# Patient Record
Sex: FEMALE | Race: WHITE | URBAN - NONMETROPOLITAN AREA
[De-identification: names, ages, dates, MRNs, and addresses within clinical notes are randomized per-mention and may not be internally consistent; named-entity substitution may affect disease eponyms.]

---

## 2017-05-09 ENCOUNTER — HOSPITAL ENCOUNTER (EMERGENCY)
Facility: HOSPITAL | Age: 22
Discharge: HOME | End: 2017-05-09
Attending: NURSE PRACTITIONER | Admitting: NURSE PRACTITIONER
Payer: COMMERCIAL

## 2017-05-09 VITALS
RESPIRATION RATE: 18 BRPM | SYSTOLIC BLOOD PRESSURE: 124 MMHG | TEMPERATURE: 98.2 F | DIASTOLIC BLOOD PRESSURE: 78 MMHG | OXYGEN SATURATION: 98 % | HEART RATE: 94 BPM | WEIGHT: 117.39 LBS

## 2017-05-09 DIAGNOSIS — M25.461 EFFUSION OF RIGHT KNEE: ICD-10-CM

## 2017-05-09 LAB
ALBUMIN SERPL-MCNC: 3.8 G/DL (ref 3.4–5)
ALP SERPL-CCNC: 67 U/L (ref 40–150)
ALT SERPL W P-5'-P-CCNC: 24 U/L (ref 0–50)
ANION GAP SERPL CALCULATED.3IONS-SCNC: 11 MMOL/L (ref 3–14)
AST SERPL W P-5'-P-CCNC: 12 U/L (ref 0–45)
BASOPHILS # BLD AUTO: 0 10E9/L (ref 0–0.2)
BASOPHILS NFR BLD AUTO: 0.3 %
BILIRUB SERPL-MCNC: 0.4 MG/DL (ref 0.2–1.3)
BUN SERPL-MCNC: 6 MG/DL (ref 7–30)
CALCIUM SERPL-MCNC: 8.8 MG/DL (ref 8.5–10.1)
CHLORIDE SERPL-SCNC: 107 MMOL/L (ref 94–109)
CO2 SERPL-SCNC: 25 MMOL/L (ref 20–32)
CREAT SERPL-MCNC: 0.52 MG/DL (ref 0.52–1.04)
CRP SERPL-MCNC: 6.8 MG/L (ref 0–8)
DIFFERENTIAL METHOD BLD: NORMAL
EOSINOPHIL # BLD AUTO: 0.1 10E9/L (ref 0–0.7)
EOSINOPHIL NFR BLD AUTO: 1 %
ERYTHROCYTE [DISTWIDTH] IN BLOOD BY AUTOMATED COUNT: 12.4 % (ref 10–15)
ERYTHROCYTE [SEDIMENTATION RATE] IN BLOOD BY WESTERGREN METHOD: 9 MM/H (ref 0–20)
GFR SERPL CREATININE-BSD FRML MDRD: ABNORMAL ML/MIN/1.7M2
GLUCOSE SERPL-MCNC: 93 MG/DL (ref 70–99)
HCT VFR BLD AUTO: 38.9 % (ref 35–47)
HGB BLD-MCNC: 13.3 G/DL (ref 11.7–15.7)
IMM GRANULOCYTES # BLD: 0 10E9/L (ref 0–0.4)
IMM GRANULOCYTES NFR BLD: 0.3 %
LYMPHOCYTES # BLD AUTO: 1.7 10E9/L (ref 0.8–5.3)
LYMPHOCYTES NFR BLD AUTO: 23.1 %
MCH RBC QN AUTO: 29.1 PG (ref 26.5–33)
MCHC RBC AUTO-ENTMCNC: 34.2 G/DL (ref 31.5–36.5)
MCV RBC AUTO: 85 FL (ref 78–100)
MONOCYTES # BLD AUTO: 0.6 10E9/L (ref 0–1.3)
MONOCYTES NFR BLD AUTO: 7.7 %
NEUTROPHILS # BLD AUTO: 4.8 10E9/L (ref 1.6–8.3)
NEUTROPHILS NFR BLD AUTO: 67.6 %
NRBC # BLD AUTO: 0 10*3/UL
NRBC BLD AUTO-RTO: 0 /100
PLATELET # BLD AUTO: 334 10E9/L (ref 150–450)
POTASSIUM SERPL-SCNC: 3.9 MMOL/L (ref 3.4–5.3)
PROT SERPL-MCNC: 7.7 G/DL (ref 6.8–8.8)
RBC # BLD AUTO: 4.57 10E12/L (ref 3.8–5.2)
SODIUM SERPL-SCNC: 143 MMOL/L (ref 133–144)
URATE SERPL-MCNC: 2.7 MG/DL (ref 2.6–6)
WBC # BLD AUTO: 7.1 10E9/L (ref 4–11)

## 2017-05-09 PROCEDURE — 99203 OFFICE O/P NEW LOW 30 MIN: CPT | Performed by: NURSE PRACTITIONER

## 2017-05-09 PROCEDURE — 99214 OFFICE O/P EST MOD 30 MIN: CPT

## 2017-05-09 PROCEDURE — 85025 COMPLETE CBC W/AUTO DIFF WBC: CPT | Performed by: NURSE PRACTITIONER

## 2017-05-09 PROCEDURE — 73562 X-RAY EXAM OF KNEE 3: CPT | Mod: TC,RT

## 2017-05-09 PROCEDURE — 86618 LYME DISEASE ANTIBODY: CPT | Performed by: NURSE PRACTITIONER

## 2017-05-09 PROCEDURE — 85652 RBC SED RATE AUTOMATED: CPT | Performed by: NURSE PRACTITIONER

## 2017-05-09 PROCEDURE — 84550 ASSAY OF BLOOD/URIC ACID: CPT | Performed by: NURSE PRACTITIONER

## 2017-05-09 PROCEDURE — 86140 C-REACTIVE PROTEIN: CPT | Performed by: NURSE PRACTITIONER

## 2017-05-09 PROCEDURE — 80053 COMPREHEN METABOLIC PANEL: CPT | Performed by: NURSE PRACTITIONER

## 2017-05-09 PROCEDURE — 36415 COLL VENOUS BLD VENIPUNCTURE: CPT | Performed by: NURSE PRACTITIONER

## 2017-05-09 RX ORDER — NORELGESTROMIN AND ETHINYL ESTRADIOL 35; 150 UG/MG; UG/MG
1 PATCH TRANSDERMAL
COMMUNITY
Start: 2017-04-24

## 2017-05-09 ASSESSMENT — ENCOUNTER SYMPTOMS
GASTROINTESTINAL NEGATIVE: 1
RESPIRATORY NEGATIVE: 1
JOINT SWELLING: 1
CONSTITUTIONAL NEGATIVE: 1

## 2017-05-09 NOTE — DISCHARGE INSTRUCTIONS
Water on the Knee    Water on the knee is also known as knee effusion. The knee joint normally has less than 1 ounce of fluid. Injury or inflammation of the knee joint causes extra fluid to collect there. When this happens, the knee joint looks swollen and is usually painful. It may be hard to fully bend the knee.  The most common cause of water on the knee is osteoarthritis due to wear and tear on the joint cartilage. Other causes include injury to the cartilage, inflammatory arthritis such as gout or rheumatoid arthritis, and infection of the joint.  You may need a needle aspiration, if the cause of your water on the knee is not certain. This procedure removes a sample of joint fluid from the knee for testing. This is done with a local anesthetic. Removing excess fluid may also relieve swelling and pain.  Home care    Limit your activities. Stay off the injured leg as much as possible until pain improves.    Keep your leg elevated to reduce pain and swelling. When sleeping, place a pillow under the injured leg. When sitting, support the injured leg so it is level with your waist. This is very important during the first 48 hours.    Apply an ice pack over the injured area for 15 to 20 minutes every 3 to 6 hours. You should do this for the first 24 to 48 hours. You can make an ice pack by filling a plastic bag that seals at the top with ice cubes and then wrapping it with a thin towel. Continue to use ice packs for relief of pain and swelling as needed. As the ice melts, be careful to avoid getting your wrap, splint, or cast wet. After 48 hours, apply heat(warm shower or warm bath) for 15 to 20 minutes several times a day, or alternate ice and heat. If you have to wear a hook-and-loop knee brace, you can open it to apply the ice pack, or heat, directly to the knee. Never put ice directly on the skin. Always wrap the ice in a towel or other type of cloth.    You may use over-the-counter pain medicine to control  pain, unless another pain medicine was prescribed. If you have chronic liver or kidney disease or have ever had a stomach ulcer or GI bleeding, talk with your healthcare provider before using these medicines.    If crutches or a walker have been recommended, do not put weight on the injured leg until you can do so without pain. Check with your healthcare provider before returning to sports or full work duties.    If you have a hook-and-loop knee brace, you can remove it to bathe and sleep, unless told otherwise.  Follow-up care  Follow up with your healthcare provider as advised.  If you are overweight, talk to your healthcare provider about a weight loss program. The excess weight puts extra strain on your knees.  When to seek medical advice  Call your healthcare provider right away if any of these occur:    Increasing pain, redness, or swelling of the knee    Fever of 100.4 F (38 C) or above lasting for 24 to 48 hours    5215-4814 [employer]. [address] [place] [address], [place] [address]. All rights reserved. This information is not intended as a substitute for professional medical care. Always follow your healthcare professional's instructions.

## 2017-05-09 NOTE — ED NOTES
Patient presents with knee pain X 1 day.  NKI.  Dull pain when sitting, with exercise painful.  Pain level at 8 from 1-10 level.

## 2017-05-09 NOTE — ED AVS SNAPSHOT
[facility]    [address] [address]    Phone:  [phone]                                       Jane Villalta   MRN: [id number]    Department:  [facility]   Date of Visit:  5/9/2017           Patient Information     Date Of Birth          1995        Your diagnoses for this visit were:     Effusion of right knee        You were seen by Yuliana Valladares NP.      Follow-up Information     Follow up with Kenyetta Bailey MD. Schedule an appointment as soon as possible for a visit in 2 days.    Specialty:  Family Practice    Why:  for re-evaluation    Contact information:    [facility]  [address] [id number] [phone]          Discharge Instructions         Water on the Knee    Water on the knee is also known as knee effusion. The knee joint normally has less than 1 ounce of fluid. Injury or inflammation of the knee joint causes extra fluid to collect there. When this happens, the knee joint looks swollen and is usually painful. It may be hard to fully bend the knee.  The most common cause of water on the knee is osteoarthritis due to wear and tear on the joint cartilage. Other causes include injury to the cartilage, inflammatory arthritis such as gout or rheumatoid arthritis, and infection of the joint.  You may need a needle aspiration, if the cause of your water on the knee is not certain. This procedure removes a sample of joint fluid from the knee for testing. This is done with a local anesthetic. Removing excess fluid may also relieve swelling and pain.  Home care    Limit your activities. Stay off the injured leg as much as possible until pain improves.    Keep your leg elevated to reduce pain and swelling. When sleeping, place a pillow under the injured leg. When sitting, support the injured leg so it is level with your waist. This is very important during the first 48 hours.    Apply an ice pack over the injured area for  15 to 20 minutes every 3 to 6 hours. You should do this for the first 24 to 48 hours. You can make an ice pack by filling a plastic bag that seals at the top with ice cubes and then wrapping it with a thin towel. Continue to use ice packs for relief of pain and swelling as needed. As the ice melts, be careful to avoid getting your wrap, splint, or cast wet. After 48 hours, apply heat(warm shower or warm bath) for 15 to 20 minutes several times a day, or alternate ice and heat. If you have to wear a hook-and-loop knee brace, you can open it to apply the ice pack, or heat, directly to the knee. Never put ice directly on the skin. Always wrap the ice in a towel or other type of cloth.    You may use over-the-counter pain medicine to control pain, unless another pain medicine was prescribed. If you have chronic liver or kidney disease or have ever had a stomach ulcer or GI bleeding, talk with your healthcare provider before using these medicines.    If crutches or a walker have been recommended, do not put weight on the injured leg until you can do so without pain. Check with your healthcare provider before returning to sports or full work duties.    If you have a hook-and-loop knee brace, you can remove it to bathe and sleep, unless told otherwise.  Follow-up care  Follow up with your healthcare provider as advised.  If you are overweight, talk to your healthcare provider about a weight loss program. The excess weight puts extra strain on your knees.  When to seek medical advice  Call your healthcare provider right away if any of these occur:    Increasing pain, redness, or swelling of the knee    Fever of 100.4 F (38 C) or above lasting for 24 to 48 hours    3016-2466 [employer]. [address] [place] [address], [place] [address]. All rights reserved. This information is not intended as a substitute for professional medical care. Always follow your healthcare professional's instructions.             Review of  "your medicines      Our records show that you are taking the medicines listed below. If these are incorrect, please call your family doctor or clinic.        Dose / Directions Last dose taken    norelgestromin-ethinyl estradiol 150-35 MCG/24HR patch   Commonly known as:  ORTHO EVRA   Dose:  1 patch        1 patch   Refills:  0                Procedures and tests performed during your visit     CBC with platelets differential    CRP inflammation    Comprehensive metabolic panel    Erythrocyte sedimentation rate auto    Knee XR, 3 views, right    Lyme Disease [place] with reflex to WB Serum    Uric acid      Orders Needing Specimen Collection     None      Pending Results     Date and Time Order Name Status Description    5/9/2017 1335 Lyme Disease Chinyere with reflex to WB Serum In process     5/9/2017 1335 CRP inflammation In process     5/9/2017 1335 Knee XR, 3 views, right In process             Pending Culture Results     No orders found from 5/7/2017 to 5/10/2017.            Thank you for choosing [facility]       Thank you for choosing [facility] for your care. Our goal is always to provide you with excellent care. Hearing back from our patients is one way we can continue to improve our services. Please take a few minutes to complete the written survey that you may receive in the mail after you visit with us. Thank you!        [email or web address] Information     [facility] lets you send messages to your doctor, view your test results, renew your prescriptions, schedule appointments and more. To sign up, go to [email or web address] . Click on \"Log in\" on the left side of the screen, which will take you to the Welcome page. Then click on \"Sign up Now\" on the right side of the page.     You will be asked to enter the access code listed below, as well as some personal information. Please follow the directions to create your username and password.     Your access code is: [email or web address]  Expires: 8/7/2017  2:29 PM     Your access code will "  in 90 days. If you need help or a new code, please call your [facility] clinic or [phone].        Care EveryWhere ID     This is your Care EveryWhere ID. This could be used by other organizations to access your [facility] medical records  [id number]        After Visit Summary       This is your record. Keep this with you and show to your community pharmacist(s) and doctor(s) at your next visit.

## 2017-05-09 NOTE — ED AVS SNAPSHOT
[facility]    [address]    [place] [address]    Phone:  [phone]                                       Jane Villalta   MRN: [id number]    Department:  [facility]   Date of Visit:  5/9/2017           After Visit Summary Signature Page     I have received my discharge instructions, and my questions have been answered. I have discussed any challenges I see with this plan with the nurse or doctor.    ..........................................................................................................................................  Patient/Patient Representative Signature      ..........................................................................................................................................  Patient Representative Print Name and Relationship to Patient    ..................................................               ................................................  Date                                            Time    ..........................................................................................................................................  Reviewed by Signature/Title    ...................................................              ..............................................  Date                                                            Time

## 2017-05-09 NOTE — ED PROVIDER NOTES
History     Chief Complaint   Patient presents with     Knee Pain     right knee pain since yesterday at 1730. no injury noted. cms intact.      The history is provided by the patient. No  was used.     Jane Villalta is a 21 year old female who presents with R knee pain since yesterday, more anterior lateral aspect. Had walked 2 blocks from home to have dinner with family and started hurting after she sat down. No fall or trauma. Doesn't regularly exercise. No other joint pain, no SOB, no belly fullness/pain. Took advil 200 mg with no improvement. No ice. No previous injury.     I have reviewed the Medications, Allergies, Past Medical and Surgical History, and Social History in the Epic system.    Review of Systems   Constitutional: Negative.    HENT: Negative.    Respiratory: Negative.    Gastrointestinal: Negative.    Musculoskeletal: Positive for joint swelling (Right knee, no injury. Slight bruising. Unable to bear full weight.).       Physical Exam   BP: 124/78  Pulse: 94  Temp: 98.2  F (36.8  C)  Resp: 18  Weight: 53.3 kg (117 lb 6.3 oz)  SpO2: 98 %  Physical Exam   Constitutional: She appears well-developed and well-nourished. No distress.   HENT:   Head: Normocephalic and atraumatic.   Eyes: Conjunctivae are normal. No scleral icterus.   Neck: Normal range of motion.   Cardiovascular: Normal rate.    Pulmonary/Chest: Effort normal.   Musculoskeletal:        Right hip: Normal.        Right knee: She exhibits decreased range of motion, effusion and ecchymosis. She exhibits no deformity, no laceration, no erythema and normal alignment. Tenderness (Generalized pain, primarily on the lateral aspect\) found. Lateral joint line tenderness noted.        Right ankle: Normal.   Skin: Skin is warm and dry. No rash noted. She is not diaphoretic.   Nursing note and vitals reviewed.      ED Course     ED Course     Procedures         Right knee XR: Effusion noted, no fracture or dislocation pending  radiology read.     Labs Ordered and Resulted from Time of ED Arrival Up to the Time of Departure from the ED   COMPREHENSIVE METABOLIC PANEL - Abnormal; Notable for the following:        Result Value    Urea Nitrogen 6 (*)     All other components within normal limits   CBC WITH PLATELETS DIFFERENTIAL   CRP INFLAMMATION   ERYTHROCYTE SEDIMENTATION RATE AUTO   LYME DISEASE MARION WITH REFLEX TO WB SERUM   URIC ACID       Assessments & Plan (with Medical Decision Making)     I have reviewed the nursing notes.  I have reviewed the findings, diagnosis, plan and need for follow up with the patient.  RICE treatment.   Ibuprofen 600 mg TID PRN for pain and swelling.   Given an ace wrap in office today.   Pt declined crutches.   F/u with PCP in 2-3 days.   Return here if sx worsen.        Final diagnoses:   Effusion of right knee       5/9/2017   [facility]     Yuliana Valladares, [place]  05/09/17 0913

## 2017-05-11 LAB — B BURGDOR IGG+IGM SER QL: NORMAL (ref 0–0.89)
